# Patient Record
Sex: FEMALE | Race: WHITE | NOT HISPANIC OR LATINO | URBAN - METROPOLITAN AREA
[De-identification: names, ages, dates, MRNs, and addresses within clinical notes are randomized per-mention and may not be internally consistent; named-entity substitution may affect disease eponyms.]

---

## 2018-02-28 ENCOUNTER — APPOINTMENT (RX ONLY)
Dept: URBAN - METROPOLITAN AREA CLINIC 167 | Facility: CLINIC | Age: 68
Setting detail: DERMATOLOGY
End: 2018-02-28

## 2018-02-28 ENCOUNTER — RX ONLY (OUTPATIENT)
Age: 68
Setting detail: RX ONLY
End: 2018-02-28

## 2018-02-28 DIAGNOSIS — Z41.9 ENCOUNTER FOR PROCEDURE FOR PURPOSES OTHER THAN REMEDYING HEALTH STATE, UNSPECIFIED: ICD-10-CM

## 2018-02-28 PROBLEM — M12.9 ARTHROPATHY, UNSPECIFIED: Status: ACTIVE | Noted: 2018-02-28

## 2018-02-28 PROBLEM — E03.9 HYPOTHYROIDISM, UNSPECIFIED: Status: ACTIVE | Noted: 2018-02-28

## 2018-02-28 PROBLEM — Z85.828 PERSONAL HISTORY OF OTHER MALIGNANT NEOPLASM OF SKIN: Status: ACTIVE | Noted: 2018-02-28

## 2018-02-28 PROCEDURE — ? COSMETIC CONSULTATION: BOTULINUM TOXIN

## 2018-02-28 PROCEDURE — ? COSMETIC CONSULTATION: FILLERS

## 2018-02-28 PROCEDURE — ? BOTOX

## 2018-02-28 RX ORDER — LIDOCAINE, PRILOCAINE 25; 25 MG/G; MG/G
CREAM TOPICAL
Qty: 1 | Refills: 3 | Status: ERX | COMMUNITY
Start: 2018-02-28

## 2018-02-28 NOTE — HPI: OTHER
Condition:: This very pleasant patient is interested in exploring treatment options for photoaging and facial volume loss. See “ Cosmetic Consult/Plan” and photographs in Attachments.
Please Describe Your Condition:: No known contraindications to treatment with botulinum toxin . See “Cosmetic Procedure” note in Attachments.

## 2018-02-28 NOTE — PROCEDURE: BOTOX
Additional Area 5 Units: 0
Additional Area 1 Units: 45
Lot #: [id number]
Detail Level: Zone
Dilution (U/0.1 Cc): 4
Consent: Written consent obtained. Risks include but not limited to lid/brow ptosis, bruising, swelling, diplopia, temporary effect, incomplete chemical denervation.
Expiration Date (Month Year): 09/20
Additional Area 1 Location: Face

## 2018-03-14 ENCOUNTER — APPOINTMENT (RX ONLY)
Dept: URBAN - METROPOLITAN AREA CLINIC 167 | Facility: CLINIC | Age: 68
Setting detail: DERMATOLOGY
End: 2018-03-14

## 2018-03-14 DIAGNOSIS — Z41.9 ENCOUNTER FOR PROCEDURE FOR PURPOSES OTHER THAN REMEDYING HEALTH STATE, UNSPECIFIED: ICD-10-CM

## 2018-03-14 PROCEDURE — ? FILLERS

## 2018-03-14 NOTE — HPI: OTHER
Condition:: Filler
Please Describe Your Condition:: No known contraindications to treatment with soft tissue augumentation. See “Cosmetic Procedure” note in Attachments.

## 2018-03-14 NOTE — PROCEDURE: FILLERS
Mid Face Filler  Volume In Cc: 0
Expiration Date (Month Year): 05/19
Filler: Juvederm Volbella XC
Use Map Statement For Sites (Optional): No
Anesthesia Volume In Cc: 0.5
Post-Care Instructions: Patient instructed to apply ice to reduce swelling.
Additional Area 1 Location: Face
Consent: Written consent obtained. Risks include but not limited to bruising, beading, irregular texture, ulceration, infection, allergic reaction, scar formation, incomplete augmentation, temporary nature, procedural pain.
Map Statment: See Attach Map for Complete Details
Anesthesia Type: 1% lidocaine without epinephrine
Expiration Date (Month Year): 12/19
Lot #: [id number]
Expiration Date (Month Year): 09/19
Filler: Juvederm Vollure XC
Additional Anesthesia Volume In Cc: 6
Detail Level: Zone
Lot #: [id number]
Lot #: [id number]
Additional Area 1 Volume In Cc: 1

## 2018-03-15 ENCOUNTER — APPOINTMENT (RX ONLY)
Dept: URBAN - METROPOLITAN AREA CLINIC 170 | Facility: CLINIC | Age: 68
Setting detail: DERMATOLOGY
End: 2018-03-15

## 2018-03-15 DIAGNOSIS — Z029 ENCOUNTERS FOR UNSPECIFIED ADMINISTRATIVE PURPOSE: ICD-10-CM

## 2018-03-15 PROCEDURE — ? OTHER (COSMETIC)

## 2018-03-15 NOTE — PROCEDURE: OTHER (COSMETIC)
Detail Level: Zone
Other (Free Text): Normal edema; discussed; Treatment, alternatives, potential risks, limitations of treatment, discussed;  questions were answered. Prednisone 20 mg today and tomorrow am with food if needed. Treatment, alternatives, potential risks, limitations of treatment, discussed;  questions were answered. Patient had elevated blood pressure today. Patient instructed to have evaluated by PCP. Patient is asymptomatic and agreed to proceed with surgery. 130/80

## 2018-03-15 NOTE — HPI: OTHER
Condition:: Cosmetic f/u - 3/14/18 filler treatment
Please Describe Your Condition:: See “Cosmetic Procedure” note in Attachments.